# Patient Record
Sex: MALE | URBAN - METROPOLITAN AREA
[De-identification: names, ages, dates, MRNs, and addresses within clinical notes are randomized per-mention and may not be internally consistent; named-entity substitution may affect disease eponyms.]

---

## 2020-11-09 ENCOUNTER — PROCEDURE (OUTPATIENT)
Dept: URBAN - METROPOLITAN AREA CLINIC 27 | Facility: CLINIC | Age: 85
End: 2020-11-09
Payer: COMMERCIAL

## 2020-11-09 PROCEDURE — 92134 CPTRZ OPH DX IMG PST SGM RTA: CPT | Performed by: OPHTHALMOLOGY

## 2020-11-09 ASSESSMENT — INTRAOCULAR PRESSURE
OD: 16
OS: 14

## 2021-01-01 ENCOUNTER — OFFICE VISIT (OUTPATIENT)
Dept: URBAN - METROPOLITAN AREA CLINIC 27 | Facility: CLINIC | Age: 86
End: 2021-01-01
Payer: COMMERCIAL

## 2021-01-01 DIAGNOSIS — H43.813 BILATERAL VITREOUS DEGENERATION OF EYES: ICD-10-CM

## 2021-01-01 DIAGNOSIS — Z96.1 PRESENCE OF INTRAOCULAR LENS: ICD-10-CM

## 2021-01-01 DIAGNOSIS — H35.3231 EXUDATIVE AGE-RELATED MACULAR DEGENERATION, BILATERAL, WITH ACTIVE CHOROIDAL NEOVASCULARIZATION: Primary | ICD-10-CM

## 2021-01-01 DIAGNOSIS — E11.9 TYPE 2 DIABETES MELLITUS WITHOUT COMPLICATIONS: ICD-10-CM

## 2021-01-01 PROCEDURE — 99213 OFFICE O/P EST LOW 20 MIN: CPT | Performed by: OPHTHALMOLOGY

## 2021-01-01 PROCEDURE — 92134 CPTRZ OPH DX IMG PST SGM RTA: CPT | Performed by: OPHTHALMOLOGY

## 2021-01-01 PROCEDURE — 92250 FUNDUS PHOTOGRAPHY W/I&R: CPT | Performed by: OPHTHALMOLOGY

## 2021-01-01 PROCEDURE — 92242 FLUORESCEIN&ICG ANGIOGRAPHY: CPT | Performed by: OPHTHALMOLOGY

## 2021-01-01 ASSESSMENT — INTRAOCULAR PRESSURE
OD: 16
OS: 18
OS: 15
OD: 19
OS: 18
OD: 18
OD: 16
OS: 14

## 2021-01-18 ENCOUNTER — PROCEDURE (OUTPATIENT)
Dept: URBAN - METROPOLITAN AREA CLINIC 27 | Facility: CLINIC | Age: 86
End: 2021-01-18
Payer: COMMERCIAL

## 2021-01-18 ASSESSMENT — INTRAOCULAR PRESSURE
OS: 17
OD: 22

## 2021-04-19 NOTE — IMPRESSION/PLAN
Impression: . 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 FA 7/27/20 showed staining. ICG showed blockage. Rec q12 week maint Avastin OD today to prevent VA loss in this monocular pt. 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 FA 7/27/20 showed staining with equivocal leakage. ICG showed staining. Rec q12 week maint Avastin OS today 12 weeks, photos/FA/ICG/OCT OU, Avastin OU

## 2021-07-12 NOTE — IMPRESSION/PLAN
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 FA 7/12/21 shows staining. ICG shows blockage. Rec q12 week maint Avastin OD today to prevent VA loss in this monocular pt. 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 FA 7/12/21 shows staining with equivocal leakage. ICG shows staining. Rec q12 week maint Avastin OS today 12 weeks, DFE/OCT OU, Avastin OU

## 2021-09-27 NOTE — IMPRESSION/PLAN
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 FA 7/12/21 showed staining. ICG showed blockage. Rec q12 week maint Avastin OD today to prevent VA loss in this monocular pt. 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 FA 7/12/21 showed staining with equivocal leakage. ICG showed staining. Rec q12 week maint Avastin OS today 12 weeks, photos/OCT OU, Avastin OU

## 2021-12-27 NOTE — IMPRESSION/PLAN
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 FA 7/12/21 showed staining. ICG showed blockage. Rec q12 week maint Avastin OD today to prevent VA loss in this monocular pt. 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 FA 7/12/21 showed staining with equivocal leakage. ICG showed staining. Rec q12 week maint Avastin OS today 12 weeks, DFE/OCT OU, Avastin OU